# Patient Record
Sex: FEMALE | Race: WHITE | NOT HISPANIC OR LATINO | Employment: OTHER | URBAN - NONMETROPOLITAN AREA
[De-identification: names, ages, dates, MRNs, and addresses within clinical notes are randomized per-mention and may not be internally consistent; named-entity substitution may affect disease eponyms.]

---

## 2020-02-28 ENCOUNTER — OFFICE VISIT (OUTPATIENT)
Dept: FAMILY MEDICINE | Facility: OTHER | Age: 60
End: 2020-02-28
Attending: PHYSICIAN ASSISTANT
Payer: COMMERCIAL

## 2020-02-28 VITALS
WEIGHT: 161 LBS | HEART RATE: 95 BPM | TEMPERATURE: 97.3 F | DIASTOLIC BLOOD PRESSURE: 82 MMHG | SYSTOLIC BLOOD PRESSURE: 110 MMHG | OXYGEN SATURATION: 99 % | RESPIRATION RATE: 18 BRPM

## 2020-02-28 DIAGNOSIS — H65.91 OME (OTITIS MEDIA WITH EFFUSION), RIGHT: Primary | ICD-10-CM

## 2020-02-28 DIAGNOSIS — J01.00 ACUTE NON-RECURRENT MAXILLARY SINUSITIS: ICD-10-CM

## 2020-02-28 PROCEDURE — 99203 OFFICE O/P NEW LOW 30 MIN: CPT | Performed by: PHYSICIAN ASSISTANT

## 2020-02-28 NOTE — PATIENT INSTRUCTIONS
Antibiotic has been sent to pharmacy. Please take full course of antibiotic even if symptoms have completely resolved. This helps prevent against antibiotic resistance.     May use symptomatic care with tylenol or ibuprofen. May use cough syrup or cough drops. Using a humidifier works well to break up the congestion. You can also sleep propped up on a couple pillows to decrease symptoms at night.     Use a Neti Pot/sinusflush (Vincent Med Sinus Rinse) 3 times daily to irrigate sinuses/mucosal tissue.     Sudafed or mucinex work well for congestion.   If you choose pseudoephedrine, use for only 5-7 days AS DIRECTED. Speak to your pharmacist if you have any concerns about your medications. Mayalso use decongestant nasal spray, but only for 3 days MAXIMUM.    Please take tylenol as needed up to 4 times daily.  Treat symptomatically with warm salt water gargles.  Lozenges, Tylenol, Advil or Aleve as needed. Frequent swallows of cool liquid.  Oatmeal coats the throat and some patients find it soothes the pain.     Monitor for any fevers or chills. Return in 7-10 days if not feeling better. Please call clinic with any questions or concerns. Please take in a lot of fluids and get rest.     You will need to be evaluated if you start to experience:  Fever higher than 102.5 F (39.2 C)   Sudden and severe pain in the face and head   Trouble seeing or seeing double   Trouble thinking clearly   Swelling or redness around 1 or both eyes   Trouble breathing or a stiff neck    * If you are a smoker, try to quit *    Call 9-1-1 or go to the emergency room if you:  Have trouble breathing   Are drooling because you cannot swallow your saliva   Have swelling of the neck or tongue   Cannot move your neck or have trouble opening your mouth

## 2020-02-28 NOTE — PROGRESS NOTES
Nursing Notes:   Frieda Mitchell LPN  2/28/2020 10:32 AM  Signed  Chief Complaint   Patient presents with     Sinus Problem     Ear Problem     Cough         Medication Reconciliation: complete    Frieda Mitchell LPN      HPI:    Анна Styles is a 60 year old female who presents for sinus pain on cheeks.  Green nasal discharge.  Ear pain.  Hx perforated ear drum in the past.  No ear drainage.  Fevers and chills.  Cough.  No wheezing, rattling in the lungs. Sore throat on Monday 2/24.  Symptoms are not worse the next day.  Runny nose.  Teeth pain.  Keeping food and fluids down.  No dehydration concerns.  No GI symptoms.    History reviewed. No pertinent past medical history.    History reviewed. No pertinent surgical history.    History reviewed. No pertinent family history.    Social History     Tobacco Use     Smoking status: Never Smoker     Smokeless tobacco: Never Used   Substance Use Topics     Alcohol use: Not Currently       Current Outpatient Medications   Medication Sig Dispense Refill     amoxicillin-clavulanate (AUGMENTIN) 875-125 MG tablet Take 1 tablet by mouth 2 times daily for 10 days 20 tablet 0     Cholecalciferol 25 MCG/0.03ML LIQD Take 1 capsule by mouth         Allergies   Allergen Reactions     Eucalyptus Oil      Other reaction(s): Throat Swelling/Closing     Morphine Rash     Codeine Nausea and Vomiting       REVIEW OF SYSTEMS:  Refer to HPI.    EXAM:   Vitals:    /82 (BP Location: Right arm, Patient Position: Sitting, Cuff Size: Adult Regular)   Pulse 95   Temp 97.3  F (36.3  C)   Resp 18   Wt 73 kg (161 lb)   SpO2 99%     General Appearance: Pleasant, alert, appropriate appearance for age. No acute distress  Ear Exam: Erythematous right TM, translucent, bony landmarks appreciated.   Left TM: Effusion is not present. TM is not bulging. There is no pus appreciated.    Right TM: Effusion is present. TM is bulging. There is no pus appreciated.   Normal auditory  canals and external ears. Non-tender.  NOroPharynx Exam:  Erythematous posterior pharynx with no exudates. Sinus pain upon palpation of maxillary sinuses.  Chest/Respiratory Exam: Normal chest wall and respirations. Clear to auscultation.  No wheezing or crackling appreciated.  No retractions appreciated.  Cardiovascular Exam: Regular rate and rhythm. S1, S2, no murmur, click, gallop, or rubs.  Lymphatic Exam: ACLN.  Skin: no rash or abnormalities  Psychiatric Exam: Alert and oriented - appropriate affect.    PHQ Depression Screen  No flowsheet data found.    LABS:    No results found for any visits on 02/28/20.      ASSESSMENT AND PLAN:      ICD-10-CM    1. OME (otitis media with effusion), right H65.91 amoxicillin-clavulanate (AUGMENTIN) 875-125 MG tablet   2. Acute non-recurrent maxillary sinusitis J01.00 amoxicillin-clavulanate (AUGMENTIN) 875-125 MG tablet         Patient was started on Augmentin for right otitis media along with an acute nonrecurrent maxillary sinus infection.  Gave patient education.  Return to clinic with change/worsening of symptoms.     Patient Instructions   Antibiotic has been sent to pharmacy. Please take full course of antibiotic even if symptoms have completely resolved. This helps prevent against antibiotic resistance.     May use symptomatic care with tylenol or ibuprofen. May use cough syrup or cough drops. Using a humidifier works well to break up the congestion. You can also sleep propped up on a couple pillows to decrease symptoms at night.     Use a Neti Pot/sinusflush (Vincent Med Sinus Rinse) 3 times daily to irrigate sinuses/mucosal tissue.     Sudafed or mucinex work well for congestion.   If you choose pseudoephedrine, use for only 5-7 days AS DIRECTED. Speak to your pharmacist if you have any concerns about your medications. Mayalso use decongestant nasal spray, but only for 3 days MAXIMUM.    Please take tylenol as needed up to 4 times daily.  Treat symptomatically with  warm salt water gargles.  Lozenges, Tylenol, Advil or Aleve as needed. Frequent swallows of cool liquid.  Oatmeal coats the throat and some patients find it soothes the pain.     Monitor for any fevers or chills. Return in 7-10 days if not feeling better. Please call clinic with any questions or concerns. Please take in a lot of fluids and get rest.     You will need to be evaluated if you start to experience:  Fever higher than 102.5 F (39.2 C)   Sudden and severe pain in the face and head   Trouble seeing or seeing double   Trouble thinking clearly   Swelling or redness around 1 or both eyes   Trouble breathing or a stiff neck    * If you are a smoker, try to quit *    Call 9-1-1 or go to the emergency room if you:  Have trouble breathing   Are drooling because you cannot swallow your saliva   Have swelling of the neck or tongue   Cannot move your neck or have trouble opening your mouth         Alis Taylor PA-C PA-C..................2/28/2020 10:32 AM

## 2020-02-28 NOTE — NURSING NOTE
Chief Complaint   Patient presents with     Sinus Problem     Ear Problem     Cough         Medication Reconciliation: complete    Frieda Mitchell, LPN

## 2020-03-04 PROBLEM — K35.30 ACUTE APPENDICITIS WITH LOCALIZED PERITONITIS: Status: ACTIVE | Noted: 2020-03-04

## 2020-03-16 ENCOUNTER — TELEPHONE (OUTPATIENT)
Dept: FAMILY MEDICINE | Facility: OTHER | Age: 60
End: 2020-03-16

## 2020-03-16 DIAGNOSIS — H65.91 OME (OTITIS MEDIA WITH EFFUSION), RIGHT: Primary | ICD-10-CM

## 2020-03-16 DIAGNOSIS — J01.00 ACUTE NON-RECURRENT MAXILLARY SINUSITIS: ICD-10-CM

## 2020-03-16 RX ORDER — CEFDINIR 300 MG/1
300 CAPSULE ORAL 2 TIMES DAILY
Qty: 20 CAPSULE | Refills: 0 | Status: SHIPPED | OUTPATIENT
Start: 2020-03-16 | End: 2020-03-26

## 2020-03-16 NOTE — TELEPHONE ENCOUNTER
Patient notified that prescription was sent to pharmacy.  Astrid Almazan LPN  3/16/2020  9:37 AM

## 2020-03-16 NOTE — TELEPHONE ENCOUNTER
O-Patient called and state she just finished her antibiotics for a sinus infection and it hasn't gone away. Wondering if a antibiotic can just be called in so she doesn't have to come in. [facility] is [facility]. Please call patient to advise.  Aurelio Cruz on 3/16/2020 at 8:20 AM

## 2020-06-09 ENCOUNTER — TELEPHONE (OUTPATIENT)
Dept: FAMILY MEDICINE | Facility: OTHER | Age: 60
End: 2020-06-09

## 2020-06-09 NOTE — TELEPHONE ENCOUNTER
Patient has never seen Tofte?  Patient say LEV on 2/28/2020, will route to her for consideration.    Sophie Thurman LPN on 6/9/2020 at 4:25 PM

## 2020-06-09 NOTE — TELEPHONE ENCOUNTER
"Santy is telling patient she just needs a note that her weight at 145 at 5'7\" is a healthy weight to be at so she can stop the program-please call when you can-aware not here today  "

## 2020-06-09 NOTE — LETTER
2020      Анна Styles  [id number] [facility] [address]        To Whom It May Concern,     Анна Styles,  1960, is in a weight loss program.  The patient is currently on 145 pounds and is 5 feet 7 inches with a BMI of 23.  This is a healthy weight for her age.  She is at a healthy weight if she would prefer to stop the program.      Sincerely,        Alis Taylor PA-C ..................2020 7:41 AM

## 2020-06-10 NOTE — TELEPHONE ENCOUNTER
Please call the patient and clarify the request.  What program is she in?  The last weight we have documented is 161 pounds.  Alis Taylor PA-C.......... 6/10/2020 8:00 AM

## 2020-06-10 NOTE — TELEPHONE ENCOUNTER
She has been using [facility] for her weight management program.  All she would like to know is, if 145# and 5'7 with a BMI of 23 is a healthy weight for her age also.  She needs a letter stating your opinion for [facility] program.  Audra Millard LPN..........6/10/2020  4:07 PM

## 2020-06-11 NOTE — TELEPHONE ENCOUNTER
Patient would like this letter mailed.  Letter mailed today.  Norma J. Gosselin, LPN .......  6/11/2020  10:40 AM

## 2021-02-07 ENCOUNTER — HEALTH MAINTENANCE LETTER (OUTPATIENT)
Age: 61
End: 2021-02-07

## 2021-03-09 ENCOUNTER — HOSPITAL ENCOUNTER (OUTPATIENT)
Dept: MAMMOGRAPHY | Facility: OTHER | Age: 61
Discharge: HOME | End: 2021-03-09
Attending: FAMILY MEDICINE | Admitting: FAMILY MEDICINE
Payer: COMMERCIAL

## 2021-03-09 DIAGNOSIS — Z12.31 VISIT FOR SCREENING MAMMOGRAM: ICD-10-CM

## 2021-03-09 PROCEDURE — 77067 SCR MAMMO BI INCL CAD: CPT

## 2021-09-07 ENCOUNTER — ALLIED HEALTH/NURSE VISIT (OUTPATIENT)
Dept: FAMILY MEDICINE | Facility: OTHER | Age: 61
End: 2021-09-07
Payer: COMMERCIAL

## 2021-09-07 DIAGNOSIS — Z23 NEED FOR ZOSTER VACCINATION: Primary | ICD-10-CM

## 2021-09-07 PROCEDURE — 90471 IMMUNIZATION ADMIN: CPT

## 2021-09-07 PROCEDURE — 90750 HZV VACC RECOMBINANT IM: CPT

## 2021-09-07 NOTE — PROGRESS NOTES
Immunization Documentation - Shingrix #1  Verified patient's first and last name, and . Stated reason for visit today is to receive the Shingrix vaccine. Denied any concerns with previous immunizations. Allergies reviewed. VIS handout(s) reviewed and given to take home. Shingrix prepared and administered IM per standing order. Administration documented in IMMUNIZATIONS (see flowsheet and order for further information). Patient Instructed to wait in lobby for 15 minutes post-injection and notify staff immediately of any reaction.     Ladonna Perez RN ....................  2021   11:12 AM

## 2021-09-24 ENCOUNTER — ALLIED HEALTH/NURSE VISIT (OUTPATIENT)
Dept: FAMILY MEDICINE | Facility: OTHER | Age: 61
End: 2021-09-24
Attending: FAMILY MEDICINE
Payer: COMMERCIAL

## 2021-09-24 DIAGNOSIS — R05.9 COUGH: Primary | ICD-10-CM

## 2021-09-24 PROCEDURE — U0005 INFEC AGEN DETEC AMPLI PROBE: HCPCS | Mod: ZL

## 2021-09-24 PROCEDURE — C9803 HOPD COVID-19 SPEC COLLECT: HCPCS

## 2021-09-26 LAB — SARS-COV-2 RNA RESP QL NAA+PROBE: NEGATIVE

## 2021-11-08 ENCOUNTER — ALLIED HEALTH/NURSE VISIT (OUTPATIENT)
Dept: FAMILY MEDICINE | Facility: OTHER | Age: 61
End: 2021-11-08
Attending: PHYSICIAN ASSISTANT
Payer: COMMERCIAL

## 2021-11-08 DIAGNOSIS — Z23 NEED FOR ZOSTER VACCINATION: ICD-10-CM

## 2021-11-08 DIAGNOSIS — Z23 NEED FOR VACCINATION: Primary | ICD-10-CM

## 2021-11-08 DIAGNOSIS — Z23 NEED FOR PROPHYLACTIC VACCINATION AND INOCULATION AGAINST INFLUENZA: ICD-10-CM

## 2021-11-08 PROCEDURE — 90682 RIV4 VACC RECOMBINANT DNA IM: CPT

## 2021-11-08 PROCEDURE — 90632 HEPA VACCINE ADULT IM: CPT

## 2021-11-08 PROCEDURE — 90750 HZV VACC RECOMBINANT IM: CPT

## 2021-11-08 PROCEDURE — 90715 TDAP VACCINE 7 YRS/> IM: CPT

## 2021-11-08 PROCEDURE — 90471 IMMUNIZATION ADMIN: CPT

## 2021-11-08 PROCEDURE — 90472 IMMUNIZATION ADMIN EACH ADD: CPT

## 2021-11-08 NOTE — PROGRESS NOTES
Immunization Documentation  Verified patient's first and last name, and . Stated reason for visit today is to receive Shingrix #2, Boostrix, Flu and Hep A vaccines. Accompained to clinic visit with self. Denied any concerns with previous immunizations. Allergies reviewed. VIS handout(s) reviewed and given to take home. vaccines prepared and administered per standing order. Administration documented in IMMUNIZATIONS (see flowsheet and order for further information). Pt Instructed to wait in lobby for 15 minutes post-injection and notify staff immediately of any reaction.     Ladonna Perez RN ....................  2021   1:30 PM

## 2022-09-15 ENCOUNTER — IMMUNIZATION (OUTPATIENT)
Dept: FAMILY MEDICINE | Facility: OTHER | Age: 62
End: 2022-09-15
Attending: FAMILY MEDICINE
Payer: COMMERCIAL

## 2022-09-15 DIAGNOSIS — Z23 HIGH PRIORITY FOR 2019-NCOV VACCINE: ICD-10-CM

## 2022-09-15 DIAGNOSIS — Z23 NEED FOR PROPHYLACTIC VACCINATION AND INOCULATION AGAINST INFLUENZA: Primary | ICD-10-CM

## 2022-09-15 PROCEDURE — 0124A COVID-19,PF,PFIZER BOOSTER BIVALENT: CPT

## 2022-09-15 PROCEDURE — 90471 IMMUNIZATION ADMIN: CPT

## 2022-09-15 PROCEDURE — 91312 COVID-19,PF,PFIZER BOOSTER BIVALENT: CPT

## 2022-09-15 PROCEDURE — 90682 RIV4 VACC RECOMBINANT DNA IM: CPT

## 2023-03-13 ENCOUNTER — OFFICE VISIT (OUTPATIENT)
Dept: FAMILY MEDICINE | Facility: OTHER | Age: 63
End: 2023-03-13
Attending: PHYSICIAN ASSISTANT
Payer: COMMERCIAL

## 2023-03-13 VITALS
DIASTOLIC BLOOD PRESSURE: 80 MMHG | HEART RATE: 81 BPM | WEIGHT: 148.5 LBS | OXYGEN SATURATION: 98 % | HEIGHT: 66 IN | SYSTOLIC BLOOD PRESSURE: 128 MMHG | RESPIRATION RATE: 20 BRPM | BODY MASS INDEX: 23.87 KG/M2 | TEMPERATURE: 97.4 F

## 2023-03-13 DIAGNOSIS — Z00.00 ROUTINE GENERAL MEDICAL EXAMINATION AT A HEALTH CARE FACILITY: Primary | ICD-10-CM

## 2023-03-13 DIAGNOSIS — B35.1 TOENAIL FUNGUS: ICD-10-CM

## 2023-03-13 DIAGNOSIS — Z23 NEED FOR VACCINATION FOR PNEUMOCOCCUS: ICD-10-CM

## 2023-03-13 DIAGNOSIS — Z13.1 SCREENING FOR DIABETES MELLITUS: ICD-10-CM

## 2023-03-13 DIAGNOSIS — Z11.59 ENCOUNTER FOR HEPATITIS C SCREENING TEST FOR LOW RISK PATIENT: ICD-10-CM

## 2023-03-13 DIAGNOSIS — Z13.220 SCREENING CHOLESTEROL LEVEL: ICD-10-CM

## 2023-03-13 DIAGNOSIS — Z12.11 SPECIAL SCREENING FOR MALIGNANT NEOPLASMS, COLON: ICD-10-CM

## 2023-03-13 DIAGNOSIS — Z11.4 SCREENING FOR HIV (HUMAN IMMUNODEFICIENCY VIRUS): ICD-10-CM

## 2023-03-13 DIAGNOSIS — Z12.31 ENCOUNTER FOR SCREENING MAMMOGRAM FOR MALIGNANT NEOPLASM OF BREAST: ICD-10-CM

## 2023-03-13 PROBLEM — K35.30 ACUTE APPENDICITIS WITH LOCALIZED PERITONITIS: Status: RESOLVED | Noted: 2020-03-04 | Resolved: 2023-03-13

## 2023-03-13 LAB
ALBUMIN SERPL BCG-MCNC: 4.6 G/DL (ref 3.5–5.2)
ALP SERPL-CCNC: 56 U/L (ref 35–104)
ALT SERPL W P-5'-P-CCNC: 21 U/L (ref 10–35)
ANION GAP SERPL CALCULATED.3IONS-SCNC: 8 MMOL/L (ref 7–15)
AST SERPL W P-5'-P-CCNC: 20 U/L (ref 10–35)
BASOPHILS # BLD AUTO: 0 10E3/UL (ref 0–0.2)
BASOPHILS NFR BLD AUTO: 1 %
BILIRUB SERPL-MCNC: 0.3 MG/DL
BUN SERPL-MCNC: 11.7 MG/DL (ref 8–23)
CALCIUM SERPL-MCNC: 9.4 MG/DL (ref 8.8–10.2)
CHLORIDE SERPL-SCNC: 104 MMOL/L (ref 98–107)
CHOLEST SERPL-MCNC: 255 MG/DL
CREAT SERPL-MCNC: 0.51 MG/DL (ref 0.51–0.95)
DEPRECATED HCO3 PLAS-SCNC: 29 MMOL/L (ref 22–29)
EOSINOPHIL # BLD AUTO: 0.1 10E3/UL (ref 0–0.7)
EOSINOPHIL NFR BLD AUTO: 2 %
ERYTHROCYTE [DISTWIDTH] IN BLOOD BY AUTOMATED COUNT: 12.1 % (ref 10–15)
GFR SERPL CREATININE-BSD FRML MDRD: >90 ML/MIN/1.73M2
GLUCOSE SERPL-MCNC: 93 MG/DL (ref 70–99)
HCT VFR BLD AUTO: 39.6 % (ref 35–47)
HDLC SERPL-MCNC: 78 MG/DL
HGB BLD-MCNC: 13.3 G/DL (ref 11.7–15.7)
IMM GRANULOCYTES # BLD: 0 10E3/UL
IMM GRANULOCYTES NFR BLD: 0 %
LDLC SERPL CALC-MCNC: 143 MG/DL
LYMPHOCYTES # BLD AUTO: 1.2 10E3/UL (ref 0.8–5.3)
LYMPHOCYTES NFR BLD AUTO: 26 %
MCH RBC QN AUTO: 32.4 PG (ref 26.5–33)
MCHC RBC AUTO-ENTMCNC: 33.6 G/DL (ref 31.5–36.5)
MCV RBC AUTO: 96 FL (ref 78–100)
MONOCYTES # BLD AUTO: 0.4 10E3/UL (ref 0–1.3)
MONOCYTES NFR BLD AUTO: 9 %
NEUTROPHILS # BLD AUTO: 2.8 10E3/UL (ref 1.6–8.3)
NEUTROPHILS NFR BLD AUTO: 62 %
NONHDLC SERPL-MCNC: 177 MG/DL
NRBC # BLD AUTO: 0 10E3/UL
NRBC BLD AUTO-RTO: 0 /100
PLATELET # BLD AUTO: 246 10E3/UL (ref 150–450)
POTASSIUM SERPL-SCNC: 3.6 MMOL/L (ref 3.4–5.3)
PROT SERPL-MCNC: 7.1 G/DL (ref 6.4–8.3)
RBC # BLD AUTO: 4.11 10E6/UL (ref 3.8–5.2)
SODIUM SERPL-SCNC: 141 MMOL/L (ref 136–145)
TRIGL SERPL-MCNC: 172 MG/DL
WBC # BLD AUTO: 4.4 10E3/UL (ref 4–11)

## 2023-03-13 PROCEDURE — 90471 IMMUNIZATION ADMIN: CPT | Performed by: PHYSICIAN ASSISTANT

## 2023-03-13 PROCEDURE — 85004 AUTOMATED DIFF WBC COUNT: CPT | Mod: ZL | Performed by: PHYSICIAN ASSISTANT

## 2023-03-13 PROCEDURE — 86803 HEPATITIS C AB TEST: CPT | Mod: ZL | Performed by: PHYSICIAN ASSISTANT

## 2023-03-13 PROCEDURE — 80061 LIPID PANEL: CPT | Mod: ZL | Performed by: PHYSICIAN ASSISTANT

## 2023-03-13 PROCEDURE — 90732 PPSV23 VACC 2 YRS+ SUBQ/IM: CPT | Performed by: PHYSICIAN ASSISTANT

## 2023-03-13 PROCEDURE — 36415 COLL VENOUS BLD VENIPUNCTURE: CPT | Mod: ZL | Performed by: PHYSICIAN ASSISTANT

## 2023-03-13 PROCEDURE — 87389 HIV-1 AG W/HIV-1&-2 AB AG IA: CPT | Mod: ZL | Performed by: PHYSICIAN ASSISTANT

## 2023-03-13 PROCEDURE — 99386 PREV VISIT NEW AGE 40-64: CPT | Mod: 25 | Performed by: PHYSICIAN ASSISTANT

## 2023-03-13 PROCEDURE — 80053 COMPREHEN METABOLIC PANEL: CPT | Mod: ZL | Performed by: PHYSICIAN ASSISTANT

## 2023-03-13 RX ORDER — TERBINAFINE HYDROCHLORIDE 250 MG/1
250 TABLET ORAL DAILY
Qty: 90 TABLET | Refills: 0 | Status: SHIPPED | OUTPATIENT
Start: 2023-03-13 | End: 2023-06-11

## 2023-03-13 ASSESSMENT — ENCOUNTER SYMPTOMS
WEAKNESS: 0
FREQUENCY: 0
PALPITATIONS: 0
NAUSEA: 0
HEARTBURN: 0
SORE THROAT: 0
EYE PAIN: 0
ARTHRALGIAS: 1
DYSURIA: 0
HEADACHES: 1
HEMATOCHEZIA: 0
FEVER: 0
HEMATURIA: 0
MYALGIAS: 0
COUGH: 0
CHILLS: 0
CONSTIPATION: 0
SHORTNESS OF BREATH: 0
PARESTHESIAS: 0
DIZZINESS: 0
NERVOUS/ANXIOUS: 0
DIARRHEA: 0
ABDOMINAL PAIN: 0
JOINT SWELLING: 0
BREAST MASS: 0

## 2023-03-13 ASSESSMENT — PAIN SCALES - GENERAL: PAINLEVEL: NO PAIN (0)

## 2023-03-13 NOTE — PROGRESS NOTES
SUBJECTIVE:   CC: Анна is an 63 year old who presents for preventive health visit.   Patient has been advised of split billing requirements and indicates understanding: Yes  Healthy Habits:     Getting at least 3 servings of Calcium per day:  Yes    Bi-annual eye exam:  Yes    Dental care twice a year:  Yes    Sleep apnea or symptoms of sleep apnea:  None    Diet:  Regular (no restrictions)    Frequency of exercise:  6-7 days/week    Duration of exercise:  15-30 minutes    Taking medications regularly:  Not Applicable    Medication side effects:  Not applicable    PHQ-2 Total Score: 0    Additional concerns today:  No    No LMP recorded. Patient has had a hysterectomy.   Contraception: hysterectomy, non-cancerous reasons  Risk for STI?: none  Last pap: na  Any hx of abnormal paps:  none  FH of early CA?: none  Cholesterol/DM concerns/screenin, repeated  Tobacco?: no  Lung cancer screening: na  Calcium intake: yes  DEXA: none, declines  Last mammo: , ordered repeat  Colonoscopy: , ordered cologuard  Hepatitis C screen: none, completed   HIV screen: none, completed   Immunizations: needs to complete pneumonia series. Gave pneumovax 23    Patient has been struggling with a right foot toe fungus.  Has been present since 2022.  Has tried over-the-counter treatments which have not helped.  Wondering about getting a prescription.  Some of the toenails have  from the nailbed.  Discoloration of the toenails.    Today's PHQ-2 Score:   PHQ-2 (  [employer]) 3/13/2023   Q1: Little interest or pleasure in doing things 0   Q2: Feeling down, depressed or hopeless 0   PHQ-2 Score 0   PHQ-2 Total Score (12-17 Years)- Positive if 3 or more points; Administer PHQ-A if positive -   Q1: Little interest or pleasure in doing things Not at all   Q2: Feeling down, depressed or hopeless Not at all   PHQ-2 Score 0       Have you ever done Advance Care Planning? (For example, a Health Directive, POLST, or a  discussion with a medical provider or your loved ones about your wishes): Yes, patient states has an Advance Care Planning document and will bring a copy to the clinic.    Social History     Tobacco Use     Smoking status: Never     Smokeless tobacco: Never   Substance Use Topics     Alcohol use: Not Currently         Alcohol Use 3/13/2023   Prescreen: >3 drinks/day or >7 drinks/week? No       Reviewed orders with patient.  Reviewed health maintenance and updated orders accordingly - Yes  Labs reviewed in EPIC  BP Readings from Last 3 Encounters:   03/13/23 128/80   02/28/20 110/82    Wt Readings from Last 3 Encounters:   03/13/23 67.4 kg (148 lb 8 oz)   02/28/20 73 kg (161 lb)                  Patient Active Problem List   Diagnosis     Achilles tendonitis     Baker's cyst of knee     BPV (benign positional vertigo)     Leukopenia     Syncope and collapse     Toenail fungus     Past Surgical History:   Procedure Laterality Date     CATARACT EXTRACTION Bilateral      GALLBLADDER SURGERY       HYSTERECTOMY      noncancerous, had bad periods     HYSTERECTOMY, PAP NO LONGER INDICATED         Social History     Tobacco Use     Smoking status: Never     Smokeless tobacco: Never   Substance Use Topics     Alcohol use: Not Currently     Family History   Problem Relation Age of Onset     Heart Disease Father          Current Outpatient Medications   Medication Sig Dispense Refill     terbinafine (LAMISIL) 250 MG tablet Take 1 tablet (250 mg) by mouth daily for 90 days 90 tablet 0     Cholecalciferol 25 MCG/0.03ML LIQD Take 1 capsule by mouth       Allergies   Allergen Reactions     Eucalyptus Oil      Other reaction(s): Throat Swelling/Closing     Morphine Rash     Codeine Nausea and Vomiting     No lab results found.     Breast Cancer Screening:  Any new diagnosis of family breast, ovarian, or bowel cancer? No    FHS-7: No flowsheet data found.    Mammogram Screening: Recommended mammography every 1-2 years with patient  discussion and risk factor consideration  Pertinent mammograms are reviewed under the imaging tab.    History of abnormal Pap smear: Status post benign hysterectomy. Health Maintenance and Surgical History updated.     Reviewed and updated as needed this visit by clinical staff   Tobacco  Allergies  Meds  Problems  Med Hx  Surg Hx  Fam Hx          Reviewed and updated as needed this visit by Provider   Tobacco  Allergies  Meds  Problems  Med Hx  Surg Hx  Fam Hx         Past Medical History:   Diagnosis Date     Acute appendicitis with localized peritonitis 3/4/2020      Past Surgical History:   Procedure Laterality Date     CATARACT EXTRACTION Bilateral      GALLBLADDER SURGERY       HYSTERECTOMY      noncancerous, had bad periods     HYSTERECTOMY, PAP NO LONGER INDICATED       OB History   No obstetric history on file.       Review of Systems   Constitutional: Negative for chills and fever.   HENT: Negative for congestion, ear pain, hearing loss and sore throat.    Eyes: Negative for pain and visual disturbance.   Respiratory: Negative for cough and shortness of breath.    Cardiovascular: Negative for chest pain, palpitations and peripheral edema.   Gastrointestinal: Negative for abdominal pain, constipation, diarrhea, heartburn, hematochezia and nausea.   Breasts:  Negative for tenderness, breast mass and discharge.   Genitourinary: Negative for dysuria, frequency, genital sores, hematuria, pelvic pain, urgency, vaginal bleeding and vaginal discharge.   Musculoskeletal: Positive for arthralgias. Negative for joint swelling and myalgias.   Skin: Negative for rash.   Neurological: Positive for headaches. Negative for dizziness, weakness and paresthesias.   Psychiatric/Behavioral: Negative for mood changes. The patient is not nervous/anxious.           OBJECTIVE:   /80 (BP Location: Right arm, Patient Position: Sitting, Cuff Size: Adult Regular)   Pulse 81   Temp 97.4  F (36.3  C) (Tympanic)    "Resp 20   Ht 1.676 m (5' 6\")   Wt 67.4 kg (148 lb 8 oz)   SpO2 98%   BMI 23.97 kg/m    Physical Exam  GENERAL APPEARANCE: healthy, alert and no distress  EYES: Eyes grossly normal to inspection, PERRL and conjunctivae and sclerae normal  HENT: ear canals and TM's normal, nose and mouth without ulcers or lesions, oropharynx clear and oral mucous membranes moist  NECK: no adenopathy, no asymmetry, masses, or scars and thyroid normal to palpation  RESP: lungs clear to auscultation - no rales, rhonchi or wheezes  CV: regular rate and rhythm, normal S1 S2, no S3 or S4, no murmur, click or rub, no peripheral edema and peripheral pulses strong  ABDOMEN: soft, nontender, no hepatosplenomegaly, no masses and bowel sounds normal  MS: no musculoskeletal defects are noted and gait is age appropriate without ataxia  SKIN: no suspicious lesions or rashes.  Right toenails are thickened and have dark yellow/green discoloration.  NEURO: Normal strength and tone, sensory exam grossly normal, mentation intact and speech normal  PSYCH: mentation appears normal and affect normal/bright    Diagnostic Test Results:  Labs reviewed in Epic    ASSESSMENT/PLAN:       ICD-10-CM    1. Routine general medical examination at a health care facility  Z00.00       2. Screening cholesterol level  Z13.220 Lipid Panel     CBC and Differential     Comprehensive Metabolic Panel     Lipid Panel     CBC and Differential     Comprehensive Metabolic Panel      3. Screening for diabetes mellitus  Z13.1 CBC and Differential     Comprehensive Metabolic Panel     CBC and Differential     Comprehensive Metabolic Panel      4. Encounter for screening mammogram for malignant neoplasm of breast  Z12.31 MA Screen Bilateral w/[place]      5. Encounter for hepatitis C screening test for low risk patient  Z11.59 Hepatitis C Screen Reflex to HCV RNA Quant and Genotype     Hepatitis C Screen Reflex to HCV RNA Quant and Genotype      6. Screening for HIV (human " immunodeficiency virus)  Z11.4 HIV Antigen Antibody Combo     HIV Antigen Antibody Combo      7. Special screening for malignant neoplasms, colon  Z12.11 COLOGUARD(EXACT SCIENCES)      8. Toenail fungus  B35.1 terbinafine (LAMISIL) 250 MG tablet      9. Need for vaccination for pneumococcus  Z23 GH IMM-  PNEUMOCOCCAL VACCINE,ADULT,SQ OR IM        Completed cholesterol and diabetes mellitus screening.    Ordered mammogram for breast cancer screening.    Completed low risk HIV and hepatitis C screen.    Ordered Cologuard for colon cancer screening.    Toenail fungus: Patient was given Lamisil for treatment.  Completed CMP for monitoring.  We will recheck liver function in 6 weeks for monitoring.  Gave warning signs and symptoms.  Return as needed for recheck.    Patient was given Pneumovax 23 to complete the pneumonia series.    Offered DEXA scan however patient declined at this time.    Repeat physical in 1 year.    Patient has been advised of split billing requirements and indicates understanding: Yes      COUNSELING:  Reviewed preventive health counseling, as reflected in patient instructions       Regular exercise       Healthy diet/nutrition       Vision screening       Hearing screening       Osteoporosis prevention/bone health       Colorectal Cancer Screening       Consider Hep C screening for all patients one time for ages 18-79 years       HIV screeninx in teen years, 1x in adult years, and at intervals if high risk        She reports that she has never smoked. She has never used smokeless tobacco.      Alis Taylor PA-C  [facility]

## 2023-03-13 NOTE — NURSING NOTE
Pt presents to clinic today for A PHYSICAL. States she has toe fungus on right foot, all toes. Has tried multiple things over the counter, has soaked her foot in apple cider vinegar with no relief.       FOOD SECURITY SCREENING QUESTIONS:    The next two questions are to help us understand your food security.  If you are feeling you need any assistance in this area, we have resources available to support you today.    Hunger Vital Signs:  Within the past 12 months we worried whether our food would run out before we got money to buy more. Never  Within the past 12 months the food we bought just didn't last and we didn't have money to get more. Never        Advance care directive on file? no  Advance care directive provided to patient? yes     Medication Reconciliation: complete  Misty Islas LPN,LPN on 3/13/2023 at 11:30 AM

## 2023-03-14 LAB
HCV AB SERPL QL IA: NONREACTIVE
HIV 1+2 AB+HIV1 P24 AG SERPL QL IA: NONREACTIVE

## 2023-03-26 LAB — NONINV COLON CA DNA+OCC BLD SCRN STL QL: NORMAL

## 2023-03-28 ENCOUNTER — TELEPHONE (OUTPATIENT)
Dept: FAMILY MEDICINE | Facility: OTHER | Age: 63
End: 2023-03-28
Payer: COMMERCIAL

## 2023-03-28 DIAGNOSIS — Z12.11 SPECIAL SCREENING FOR MALIGNANT NEOPLASMS, COLON: Primary | ICD-10-CM

## 2023-04-18 ENCOUNTER — HOSPITAL ENCOUNTER (OUTPATIENT)
Dept: MAMMOGRAPHY | Facility: OTHER | Age: 63
Discharge: HOME | End: 2023-04-18
Attending: PHYSICIAN ASSISTANT | Admitting: PHYSICIAN ASSISTANT
Payer: COMMERCIAL

## 2023-04-18 DIAGNOSIS — Z12.31 ENCOUNTER FOR SCREENING MAMMOGRAM FOR MALIGNANT NEOPLASM OF BREAST: ICD-10-CM

## 2023-04-18 PROCEDURE — 77067 SCR MAMMO BI INCL CAD: CPT

## 2023-11-20 ENCOUNTER — NURSE TRIAGE (OUTPATIENT)
Dept: FAMILY MEDICINE | Facility: OTHER | Age: 63
End: 2023-11-20
Payer: COMMERCIAL

## 2023-11-20 ENCOUNTER — OFFICE VISIT (OUTPATIENT)
Dept: FAMILY MEDICINE | Facility: OTHER | Age: 63
End: 2023-11-20
Attending: STUDENT IN AN ORGANIZED HEALTH CARE EDUCATION/TRAINING PROGRAM
Payer: COMMERCIAL

## 2023-11-20 VITALS
OXYGEN SATURATION: 99 % | RESPIRATION RATE: 18 BRPM | HEIGHT: 66 IN | DIASTOLIC BLOOD PRESSURE: 74 MMHG | SYSTOLIC BLOOD PRESSURE: 130 MMHG | WEIGHT: 149 LBS | HEART RATE: 80 BPM | BODY MASS INDEX: 23.95 KG/M2 | TEMPERATURE: 97.1 F

## 2023-11-20 DIAGNOSIS — H81.10 BENIGN PAROXYSMAL POSITIONAL VERTIGO, UNSPECIFIED LATERALITY: Primary | ICD-10-CM

## 2023-11-20 PROCEDURE — 99213 OFFICE O/P EST LOW 20 MIN: CPT | Performed by: STUDENT IN AN ORGANIZED HEALTH CARE EDUCATION/TRAINING PROGRAM

## 2023-11-20 RX ORDER — SWAB
1 SWAB, NON-MEDICATED MISCELLANEOUS DAILY PRN
COMMUNITY
Start: 2023-11-20

## 2023-11-20 RX ORDER — ONDANSETRON 4 MG/1
4 TABLET, ORALLY DISINTEGRATING ORAL EVERY 8 HOURS PRN
Qty: 30 TABLET | Refills: 0 | Status: SHIPPED | OUTPATIENT
Start: 2023-11-20

## 2023-11-20 RX ORDER — MECLIZINE HYDROCHLORIDE 25 MG/1
25 TABLET ORAL 3 TIMES DAILY PRN
Qty: 30 TABLET | Refills: 3 | Status: SHIPPED | OUTPATIENT
Start: 2023-11-20

## 2023-11-20 ASSESSMENT — PAIN SCALES - GENERAL: PAINLEVEL: NO PAIN (0)

## 2023-11-20 NOTE — TELEPHONE ENCOUNTER
"Reason for Disposition   Patient wants to be seen    Additional Information   Negative: ACUTE NEUROLOGIC SYMPTOM and symptom present now   Negative: Knocked out (unconscious) > 1 minute   Negative: Seizure (convulsion) occurred  (Exception: Prior history of seizures and now alert and without Acute Neurologic Symptoms.)   Negative: Neck pain after dangerous injury (e.g., MVA, diving, trampoline, contact sports, fall > 10 feet or 3 meters)  (Exception: Neck pain began > 1 hour after injury.)   Negative: Major bleeding (actively dripping or spurting) that can't be stopped   Negative: Penetrating head injury (e.g., knife, gunshot wound, metal object)   Negative: Sounds like a life-threatening emergency to the triager   Negative: Diagnosed with a concussion within last 14 days   Negative: Can't remember what happened (amnesia)   Negative: Vomiting once or more   Negative: Watery or blood-tinged fluid dripping from the nose or ears   Negative: ACUTE NEUROLOGIC SYMPTOM and now fine   Negative: Knocked out (unconscious) < 1 minute and now fine   Negative: SEVERE headache   Negative: Dangerous injury (e.g., MVA, diving, trampoline, contact sports, fall > 10 feet or 3 meters) or severe blow from hard object (e.g., golf club or baseball bat)   Negative: Large swelling or bruise (> 2 inches or 5 cm)   Negative: Skin is split open or gaping (length > 1/2 inch or 12 mm)   Negative: Bleeding won't stop after 10 minutes of direct pressure (using correct technique)   Negative: One or two 'black eyes' (bruising, purple color of eyelids), and onset within 24 hours of head injury   Negative: Taking Coumadin (warfarin) or other strong blood thinner, or known bleeding disorder (e.g., thrombocytopenia)   Negative: Age over 65 years with an area of head swelling or bruise   Negative: Sounds like a serious injury to the triager    Answer Assessment - Initial Assessment Questions  1. MECHANISM: \"How did the injury happen?\" For falls, ask: " "\"What height did you fall from?\" and \"What surface did you fall against?\"       Fell off 3rd step of ladder hanging artie lights  2. ONSET: \"When did the injury happen?\" (Minutes or hours ago)       \"Few days ago\"  3. NEUROLOGIC SYMPTOMS: \"Was there any loss of consciousness?\" \"Are there any other neurological symptoms?\"       No LOC, no headache  4. MENTAL STATUS: \"Does the person know who they are, who you are, and where they are?\"       yes  5. LOCATION: \"What part of the head was hit?\"         6. SCALP APPEARANCE: \"What does the scalp look like? Is it bleeding now?\" If Yes, ask: \"Is it difficult to stop?\"       no  7. SIZE: For cuts, bruises, or swelling, ask: \"How large is it?\" (e.g., inches or centimeters)       no  8. PAIN: \"Is there any pain?\" If Yes, ask: \"How bad is it?\"  (e.g., Scale 1-10; or mild, moderate, severe)      no  9. TETANUS: For any breaks in the skin, ask: \"When was the last tetanus booster?\"      no  10. BLOOD THINNERS: \"Do you take any blood thinners?\" (e.g., aspirin, clopidogrel / Plavix, coumadin, heparin). Notes: Other strong blood thinners include: Arixtra (fondaparinux), Eliquis (apixaban), Pradaxa (dabigatran), and Xarelto (rivaroxaban).        no  11. OTHER SYMPTOMS: \"Do you have any other symptoms?\" (e.g., neck pain, vomiting)        no  12. PREGNANCY: \"Is there any chance you are pregnant?\" \"When was your last menstrual period?\"        no    Protocols used: Head Injury-A-[place]    "

## 2023-11-20 NOTE — NURSING NOTE
"Medication Reconciliation: Complete    Iqra Davis LPN  11/20/2023 2:08 PM  Chief Complaint   Patient presents with    Fall     Dizziness, nausea, unsteady balance, vertigo       Initial /74 (BP Location: Right arm, Patient Position: Sitting, Cuff Size: Adult Regular)   Pulse 80   Temp 97.1  F (36.2  C) (Tympanic)   Resp 18   Ht 1.676 m (5' 6\")   Wt 67.6 kg (149 lb)   SpO2 99%   BMI 24.05 kg/m   Estimated body mass index is 24.05 kg/m  as calculated from the following:    Height as of this encounter: 1.676 m (5' 6\").    Weight as of this encounter: 67.6 kg (149 lb).  Medication Review: complete    The next two questions are to help us understand your food security.  If you are feeling you need any assistance in this area, we have resources available to support you today.          11/20/2023   SDOH- Food Insecurity   Within the past 12 months, did you worry that your food would run out before you got money to buy more? N   Within the past 12 months, did the food you bought just not last and you didn t have money to get more? N         Health Care Directive:  Patient does not have a Health Care Directive or Living Will: Discussed advance care planning with patient; however, patient declined at this time.    Iqra Davis LPN      "

## 2023-11-20 NOTE — PROGRESS NOTES
Assessment & Plan   Problem List Items Addressed This Visit          Nervous and Auditory    BPV (benign positional vertigo) - Primary    Relevant Medications    ondansetron (ZOFRAN ODT) 4 MG ODT tab    meclizine (ANTIVERT) 25 MG tablet    Other Relevant Orders    Physical Therapy Referral      Patient likely flare of previous BPPV given exam findings and history. Did not hit head.   Given Rx for zofran and meclizine. Referral to PT for vestibular therapy as that has helped in the past                  No follow-ups on file.    Vega Garcia MD  [facility]    Edwige Jj is a 63 year old, presenting for the following health issues:  Fall (Dizziness, nausea, unsteady balance, vertigo)      Fall    History of Present Illness       Reason for visit:  Dizzy nausous  Symptom onset:  3-7 days ago  Symptom intensity:  Moderate  Symptom progression:  Worsening  Had these symptoms before:  Yes  Has tried/received treatment for these symptoms:  Yes  Previous treatment was successful:  Yes  Prior treatment description:  Meclezine  What makes it better:  Not at the moment    She eats 4 or more servings of fruits and vegetables daily.She consumes 0 sweetened beverage(s) daily.She exercises with enough effort to increase her heart rate 20 to 29 minutes per day.  She exercises with enough effort to increase her heart rate 6 days per week.   She is taking medications regularly.     Dizziness   - fell with ladder (leaned with her). Neck, shoulders, and buttocks hurt  - did not hit head or LOC  - on and off nauseated and dizziness a few days later  - walking causes dizziness, turning head causes dizziness, any movement besides keeping head still causes dizziness   - fine if sitting still   - 9 years ago had a seizure then had BPPV after a viral illness              Review of Systems   Constitutional, HEENT, cardiovascular, pulmonary, gi and gu systems are negative, except as otherwise noted.     "  Objective    /74 (BP Location: Right arm, Patient Position: Sitting, Cuff Size: Adult Regular)   Pulse 80   Temp 97.1  F (36.2  C) (Tympanic)   Resp 18   Ht 1.676 m (5' 6\")   Wt 67.6 kg (149 lb)   SpO2 99%   BMI 24.05 kg/m    Body mass index is 24.05 kg/m .  Physical Exam   GENERAL: healthy, alert and no distress  HEAD: atraumatic   EYES: Eyes grossly normal to inspection, PERRL and conjunctivae and sclerae normal  RESP: lungs clear to auscultation - no rales, rhonchi or wheezes  CV: regular rate and rhythm, normal S1 S2, no S3 or S4, no murmur, click or rub, no peripheral edema and peripheral pulses strong  NEURO: Normal strength and tone. Slight nystagmus when laying on right side.   PSYCH: mentation appears normal, affect normal/bright                      "

## 2023-11-22 ENCOUNTER — THERAPY VISIT (OUTPATIENT)
Dept: PHYSICAL THERAPY | Facility: OTHER | Age: 63
End: 2023-11-22
Attending: STUDENT IN AN ORGANIZED HEALTH CARE EDUCATION/TRAINING PROGRAM
Payer: COMMERCIAL

## 2023-11-22 DIAGNOSIS — H81.10 BENIGN PAROXYSMAL POSITIONAL VERTIGO, UNSPECIFIED LATERALITY: ICD-10-CM

## 2023-11-22 PROCEDURE — 95992 CANALITH REPOSITIONING PROC: CPT | Mod: GP

## 2023-11-22 PROCEDURE — 97162 PT EVAL MOD COMPLEX 30 MIN: CPT | Mod: GP

## 2023-11-22 NOTE — PROGRESS NOTES
PHYSICAL THERAPY EVALUATION  Type of Visit: Evaluation    See electronic medical record for Abuse and Falls Screening details.    Subjective       Presenting condition or subjective complaint: Pt is a 63 year old female referred to skilled PT services following an increase in dizziness. Pt reports she tipped on a ladder and the ladder fell on her and did not hit her head or have dizziness at that time. pt reports she was not dizzy until the next day and was worried about having brain issues and went to the MD and was diagnosed with BPPV and was given zofran and meclazine and given epley. pt reports she is woken up at night with the room spinning. Pt reports dizziness with R rolling and getting in/out of bed. 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  Date of onset: 11/16/23    Relevant medical history: Dizziness; Migraines or headaches   Dates & types of surgery: hearing aids and neck pain    Prior therapy history for the same diagnosis, illness or injury: No      Prior Level of Function  Transfers: Independent  Ambulation: Independent  ADL: Independent  IADL: Driving, Finances, Housekeeping, Laundry, Meal preparation, Yard work    Living Environment  Social support: With a significant other or spouse   Type of home: House   Stairs to enter the home: Yes 3 Is there a railing: Yes   Ramp: No   Stairs inside the home: Yes 10 Is there a railing: Yes   Help at home: None  Equipment owned:       Employment: No retired  Hobbies/Interests:      Patient goals for therapy: move around without dizziness and roll in bed without dizziness.    Pain assessment: Pain denied     Objective      Cognitive Status Examination  Orientation: Oriented to person, place and time   Level of Consciousness: Alert  Follows Commands and Answers Questions: 100% of the  time  Personal Safety and Judgement: Intact  Memory: Intact    OBSERVATION: pt very hesitant about moving and holding onto furniture and walls to walk    BED MOBILITY: Independent    TRANSFERS: Independent    GAIT:   Level of [place]: SBA  Assistive Device(s): None  Gait Deviations: Base of support increased    Gait Distance: 50 feet       VESTIBULAR EVALUATION  ADDITIONAL HISTORY:  Description of symptoms: Off balance; I feel like I am spinning; Attacks of dizziness; Likely to fall; I feel like the room is spinning  Dizzy attacks:   Start: last week after falling off the ladder but did not hit head   Last attack: 9 years ago   Frequency of occurrences: falling down with ladder while putting on artie lights   Length of attack: minutes  Difficulty hearing: Left ear  Noise in ears? Yes    Alleviates symptoms: stopping moving  Worsens symptoms: rolling in bed  Activities that bring on symptoms: Bending over  rolling in bed    Pertinent history of current vestibular problem: Hearing loss,      Cervicogenic Screen    Neck ROM Normal     Oculomotor Screen    Ocular ROM Normal   Smooth Pursuit Normal   Saccades Normal   VOR Normal   VOR Cancellation Normal   Head Impulse Test Normal   Convergence Testing Normal        Infrared Goggle Exam Vestibular Suppressant in Last 24 Hours? Yes  Exam Completed With:  frenzel lenses   Spontaneous Nystagmus Negative   Gaze Evoked Nystagmus Negative    Left Right   [place]-Hallpike Negative Upbeating R torsional   Sidelying Test Negative Negative   BPPV Canal(s): R Posterior  BPPV Type: Canalithasis    Dynamic Visual Acuity (DVA)    Static Acuity (LogMar)    Horizontal Head Movement at 1 Hz (LogMar)    Horizontal Head Movement at 2 Hz (LogMar)           Assessment & Plan   CLINICAL IMPRESSIONS  Medical Diagnosis: BPPV    Treatment Diagnosis: R posterior canal BPPV   Impression/Assessment: Patient is a 63 year old female with dizziness with position changes complaints.  The following  significant findings have been identified: Impaired gait, Instability, Dizziness, Disequilibrium , and Impaired vision. These impairments interfere with their ability to perform self care tasks, recreational activities, household chores, household mobility, and community mobility as compared to previous level of function.     Clinical Decision Making (Complexity):  Clinical Presentation: Evolving/Changing  Clinical Presentation Rationale: based on medical and personal factors listed in PT evaluation  Clinical Decision Making (Complexity): Moderate complexity    PLAN OF CARE  Treatment Interventions:  Interventions: Gait Training, Manual Therapy, Neuromuscular Re-education, Therapeutic Activity, Therapeutic Exercise, Canalith Repositioning    Long Term Goals     PT Goal 1  Goal Identifier: Dizziness  Goal Description: Pt will report ability to roll R and L on mat table or bed at home without an increase in dizziness.  Target Date: 12/06/23      Frequency of Treatment: 6 times  Duration of Treatment: 8 weeks    Education Assessment:   Learner/Method: Patient    Risks and benefits of evaluation/treatment have been explained.   Patient/Family/caregiver agrees with Plan of Care.     Evaluation Time:     PT Eval, Moderate Complexity Minutes (22057): 30     Signing Clinician: Deedee Benitez DPT

## 2023-11-29 ENCOUNTER — THERAPY VISIT (OUTPATIENT)
Dept: PHYSICAL THERAPY | Facility: OTHER | Age: 63
End: 2023-11-29
Attending: STUDENT IN AN ORGANIZED HEALTH CARE EDUCATION/TRAINING PROGRAM
Payer: COMMERCIAL

## 2023-11-29 DIAGNOSIS — H81.11 BENIGN PAROXYSMAL POSITIONAL VERTIGO OF RIGHT EAR: Primary | ICD-10-CM

## 2023-11-29 PROCEDURE — 95992 CANALITH REPOSITIONING PROC: CPT | Mod: GP

## 2024-02-21 ENCOUNTER — OFFICE VISIT (OUTPATIENT)
Dept: FAMILY MEDICINE | Facility: OTHER | Age: 64
End: 2024-02-21
Payer: COMMERCIAL

## 2024-02-21 VITALS
RESPIRATION RATE: 16 BRPM | HEIGHT: 67 IN | TEMPERATURE: 97.6 F | HEART RATE: 80 BPM | DIASTOLIC BLOOD PRESSURE: 78 MMHG | BODY MASS INDEX: 23.7 KG/M2 | SYSTOLIC BLOOD PRESSURE: 128 MMHG | WEIGHT: 151 LBS

## 2024-02-21 DIAGNOSIS — M25.521 PAIN AND SWELLING OF RIGHT ELBOW: ICD-10-CM

## 2024-02-21 DIAGNOSIS — L03.113 CELLULITIS OF RIGHT ELBOW: ICD-10-CM

## 2024-02-21 DIAGNOSIS — M70.31 BURSITIS OF RIGHT ELBOW, UNSPECIFIED BURSA: Primary | ICD-10-CM

## 2024-02-21 DIAGNOSIS — M25.421 PAIN AND SWELLING OF RIGHT ELBOW: ICD-10-CM

## 2024-02-21 LAB
ANION GAP SERPL CALCULATED.3IONS-SCNC: 13 MMOL/L (ref 7–15)
BASOPHILS # BLD AUTO: 0.1 10E3/UL (ref 0–0.2)
BASOPHILS NFR BLD AUTO: 1 %
BUN SERPL-MCNC: 13.5 MG/DL (ref 8–23)
CALCIUM SERPL-MCNC: 10 MG/DL (ref 8.8–10.2)
CHLORIDE SERPL-SCNC: 103 MMOL/L (ref 98–107)
CREAT SERPL-MCNC: 0.54 MG/DL (ref 0.51–0.95)
CRP SERPL-MCNC: 3.13 MG/L
DEPRECATED HCO3 PLAS-SCNC: 24 MMOL/L (ref 22–29)
EGFRCR SERPLBLD CKD-EPI 2021: >90 ML/MIN/1.73M2
EOSINOPHIL # BLD AUTO: 0.1 10E3/UL (ref 0–0.7)
EOSINOPHIL NFR BLD AUTO: 1 %
ERYTHROCYTE [DISTWIDTH] IN BLOOD BY AUTOMATED COUNT: 12.1 % (ref 10–15)
GLUCOSE SERPL-MCNC: 80 MG/DL (ref 70–99)
HCT VFR BLD AUTO: 38.3 % (ref 35–47)
HGB BLD-MCNC: 13.4 G/DL (ref 11.7–15.7)
IMM GRANULOCYTES # BLD: 0.1 10E3/UL
IMM GRANULOCYTES NFR BLD: 1 %
LYMPHOCYTES # BLD AUTO: 1.3 10E3/UL (ref 0.8–5.3)
LYMPHOCYTES NFR BLD AUTO: 12 %
MCH RBC QN AUTO: 32.6 PG (ref 26.5–33)
MCHC RBC AUTO-ENTMCNC: 35 G/DL (ref 31.5–36.5)
MCV RBC AUTO: 93 FL (ref 78–100)
MONOCYTES # BLD AUTO: 0.8 10E3/UL (ref 0–1.3)
MONOCYTES NFR BLD AUTO: 8 %
NEUTROPHILS # BLD AUTO: 8.1 10E3/UL (ref 1.6–8.3)
NEUTROPHILS NFR BLD AUTO: 77 %
NRBC # BLD AUTO: 0 10E3/UL
NRBC BLD AUTO-RTO: 0 /100
PLATELET # BLD AUTO: 291 10E3/UL (ref 150–450)
POTASSIUM SERPL-SCNC: 3.6 MMOL/L (ref 3.4–5.3)
RBC # BLD AUTO: 4.11 10E6/UL (ref 3.8–5.2)
SODIUM SERPL-SCNC: 140 MMOL/L (ref 135–145)
WBC # BLD AUTO: 10.5 10E3/UL (ref 4–11)

## 2024-02-21 PROCEDURE — 36415 COLL VENOUS BLD VENIPUNCTURE: CPT | Mod: ZL | Performed by: NURSE PRACTITIONER

## 2024-02-21 PROCEDURE — 80048 BASIC METABOLIC PNL TOTAL CA: CPT | Mod: ZL | Performed by: NURSE PRACTITIONER

## 2024-02-21 PROCEDURE — 250N000011 HC RX IP 250 OP 636: Performed by: NURSE PRACTITIONER

## 2024-02-21 PROCEDURE — 85004 AUTOMATED DIFF WBC COUNT: CPT | Mod: ZL | Performed by: NURSE PRACTITIONER

## 2024-02-21 PROCEDURE — 96372 THER/PROPH/DIAG INJ SC/IM: CPT | Performed by: NURSE PRACTITIONER

## 2024-02-21 PROCEDURE — 99214 OFFICE O/P EST MOD 30 MIN: CPT | Performed by: NURSE PRACTITIONER

## 2024-02-21 PROCEDURE — 86140 C-REACTIVE PROTEIN: CPT | Mod: ZL | Performed by: NURSE PRACTITIONER

## 2024-02-21 RX ORDER — CEPHALEXIN 500 MG/1
500 CAPSULE ORAL 4 TIMES DAILY
Qty: 40 CAPSULE | Refills: 0 | Status: SHIPPED | OUTPATIENT
Start: 2024-02-21 | End: 2024-03-02

## 2024-02-21 RX ORDER — PREDNISONE 20 MG/1
20 TABLET ORAL 2 TIMES DAILY
Qty: 10 TABLET | Refills: 0 | Status: SHIPPED | OUTPATIENT
Start: 2024-02-21 | End: 2024-02-26

## 2024-02-21 RX ORDER — CEFTRIAXONE SODIUM 1 G
1 VIAL (EA) INJECTION ONCE
Status: COMPLETED | OUTPATIENT
Start: 2024-02-21 | End: 2024-02-21

## 2024-02-21 RX ADMIN — CEFTRIAXONE SODIUM 1 G: 1 INJECTION, POWDER, FOR SOLUTION INTRAMUSCULAR; INTRAVENOUS at 12:55

## 2024-02-21 ASSESSMENT — PAIN SCALES - GENERAL: PAINLEVEL: EXTREME PAIN (8)

## 2024-02-21 NOTE — NURSING NOTE
"Chief Complaint   Patient presents with    Elbow Pain     Today - R elbow- hot and painful     Patient tx with ice and ibuprofen.  Unsure of how it became swollen.  Patient is leaving for [place] in the AM.    Initial /78 (BP Location: Left arm, Patient Position: Sitting, Cuff Size: Adult Regular)   Pulse 80   Temp 97.6  F (36.4  C) (Tympanic)   Resp 16   Ht 1.702 m (5' 7\")   Wt 68.5 kg (151 lb)   BMI 23.65 kg/m   Estimated body mass index is 23.65 kg/m  as calculated from the following:    Height as of this encounter: 1.702 m (5' 7\").    Weight as of this encounter: 68.5 kg (151 lb).     Advance Care Directive on file? no    FOOD SECURITY SCREENING QUESTIONS:    The next two questions are to help us understand your food security.  If you are feeling you need any assistance in this area, we have resources available to support you today.    Hunger Vital Signs:  Within the past 12 months we worried whether our food would run out before we got money to buy more. Never  Within the past 12 months the food we bought just didn't last and we didn't have money to get more. Never  Montserrat Pelayo LPN,SANJANA on 2/21/2024 at 12:04 PM      Montserrat Pelayo LPN     "

## 2024-02-21 NOTE — PROGRESS NOTES
ASSESSMENT/PLAN:     I have reviewed the nursing notes.  I have reviewed the findings, diagnosis, plan and need for follow up with the patient.      1. Pain and swelling of right elbow    - CBC and Differential  - CRP inflammation  - Basic Metabolic Panel  - predniSONE (DELTASONE) 20 MG tablet; Take 1 tablet (20 mg) by mouth 2 times daily for 5 days  Dispense: 10 tablet; Refill: 0    May use over-the-counter Tylenol or ibuprofen  PRN  Avoid use of NSAIDS while on oral steroids    2. Bursitis of right elbow, unspecified bursa    - cefTRIAXone (ROCEPHIN) in lidocaine 1% (PF) for IM administration 1 gm IM injection administered in clinic  - cephALEXin (KEFLEX) 500 MG capsule; Take 1 capsule (500 mg) by mouth 4 times daily for 10 days  Dispense: 40 capsule; Refill: 0  - predniSONE (DELTASONE) 20 MG tablet; Take 1 tablet (20 mg) by mouth 2 times daily for 5 days  Dispense: 10 tablet; Refill: 0    CBC with normal WBC and differential  CRP - non-elevated  BMP normal    Patient appropriate for outpatient treatment as she is afebrile, vital signs stable and labs are normal.    Wear splint during day and night to reduce risk of bumping and over use  May remove for showering  Continue to ice and elevate    Return immediately if worsening swelling/pain or development of fevers    3. Cellulitis of right elbow    - cefTRIAXone (ROCEPHIN) in lidocaine 1% (PF) for IM administration 1 gm IM injection administered in clinic  - cephALEXin (KEFLEX) 500 MG capsule; Take 1 capsule (500 mg) by mouth 4 times daily for 10 days  Dispense: 40 capsule; Refill: 0    Return immediately if worsening swelling/pain or development of fevers            I explained my diagnostic considerations and recommendations to the patient, who voiced understanding and agreement with the treatment plan. All questions were answered. We discussed potential side effects of any prescribed or recommended therapies, as well as expectations for response to  treatments.    Natalie Mccray NP  [facility]      SUBJECTIVE:   Анна Styles is a 64 year old female who presents to clinic today for the following health issues:  Elbow pain and swelling      HPI  Started this morning with pain and swelling of her right elbow with worsening with warmth and redness.  No known injury, trauma, or skin insult.  No fevers.  Mild chills today.    Decreased movement of elbow due to pain.  Right hand dominant.    Hx of easy bruising.  No anticoagulation.    Ibuprofen 400 mg about an hour ago.  Frequent icing today.          Past Medical History:   Diagnosis Date    Acute appendicitis with localized peritonitis 3/4/2020     Past Surgical History:   Procedure Laterality Date    CATARACT EXTRACTION Bilateral     GALLBLADDER SURGERY      HYSTERECTOMY      noncancerous, had bad periods    HYSTERECTOMY, PAP NO LONGER INDICATED       Social History     Tobacco Use    Smoking status: Never    Smokeless tobacco: Never   Substance Use Topics    Alcohol use: Not Currently     Current Outpatient Medications   Medication Sig Dispense Refill    vitamin D3 (CHOLECALCIFEROL) 10 MCG (400 UNIT) capsule Take 1 capsule (400 Units) by mouth daily as needed      meclizine (ANTIVERT) 25 MG tablet Take 1 tablet (25 mg) by mouth 3 times daily as needed for dizziness (Patient not taking: Reported on 2/21/2024) 30 tablet 3    ondansetron (ZOFRAN ODT) 4 MG ODT tab Take 1 tablet (4 mg) by mouth every 8 hours as needed for nausea (Patient not taking: Reported on 2/21/2024) 30 tablet 0     Allergies   Allergen Reactions    Eucalyptus Oil      Other reaction(s): Throat Swelling/Closing    Morphine Rash    Codeine Nausea and Vomiting         Past medical history, past surgical history, current medications and allergies reviewed and accurate to the best of my knowledge.        OBJECTIVE:     /78 (BP Location: Left arm, Patient Position: Sitting, Cuff Size: Adult Regular)   Pulse 80   Temp  "97.6  F (36.4  C) (Tympanic)   Resp 16   Ht 1.702 m (5' 7\")   Wt 68.5 kg (151 lb)   BMI 23.65 kg/m    Body mass index is 23.65 kg/m .        Physical Exam  General Appearance: Well appearing adult female, appropriate appearance for age. No acute distress  Cardiac:  CMS intact to right upper extremity with brisk capillary refill and strong radial pulse   Musculoskeletal:  Equal movement of bilateral upper extremities with exception of right elbow.  Right posterior elbow with swelling and significant tenderness.  Decreased flexion of right elbow due to pain and guarding.  Near normal flexion of right elbow.  Equal movement of bilateral lower extremities.  Normal gait.    Dermatological:  Right posterior elbow with large circular area of bright erythema with warmth, swelling present with fluctuance, skin intact.  See attached photo.  Psychological: normal affect, alert, oriented, and pleasant.       Labs:  Results for orders placed or performed in visit on 02/21/24   CRP inflammation     Status: Normal   Result Value Ref Range    CRP Inflammation 3.13 <5.00 mg/L   Basic Metabolic Panel     Status: Normal   Result Value Ref Range    Sodium 140 135 - 145 mmol/L    Potassium 3.6 3.4 - 5.3 mmol/L    Chloride 103 98 - 107 mmol/L    Carbon Dioxide (CO2) 24 22 - 29 mmol/L    Anion Gap 13 7 - 15 mmol/L    Urea Nitrogen 13.5 8.0 - 23.0 mg/dL    Creatinine 0.54 0.51 - 0.95 mg/dL    GFR Estimate >90 >60 mL/min/1.73m2    Calcium 10.0 8.8 - 10.2 mg/dL    Glucose 80 70 - 99 mg/dL   CBC with platelets and differential     Status: None   Result Value Ref Range    WBC Count 10.5 4.0 - 11.0 10e3/uL    RBC Count 4.11 3.80 - 5.20 10e6/uL    Hemoglobin 13.4 11.7 - 15.7 g/dL    Hematocrit 38.3 35.0 - 47.0 %    MCV 93 78 - 100 fL    MCH 32.6 26.5 - 33.0 pg    MCHC 35.0 31.5 - 36.5 g/dL    RDW 12.1 10.0 - 15.0 %    Platelet Count 291 150 - 450 10e3/uL    % Neutrophils 77 %    % Lymphocytes 12 %    % Monocytes 8 %    % Eosinophils 1 %    % " Basophils 1 %    % Immature Granulocytes 1 %    NRBCs per 100 WBC 0 <1 /100    Absolute Neutrophils 8.1 1.6 - 8.3 10e3/uL    Absolute Lymphocytes 1.3 0.8 - 5.3 10e3/uL    Absolute Monocytes 0.8 0.0 - 1.3 10e3/uL    Absolute Eosinophils 0.1 0.0 - 0.7 10e3/uL    Absolute Basophils 0.1 0.0 - 0.2 10e3/uL    Absolute Immature Granulocytes 0.1 <=0.4 10e3/uL    Absolute NRBCs 0.0 10e3/uL   CBC and Differential     Status: None    Narrative    The following orders were created for panel order CBC and Differential.  Procedure                               Abnormality         Status                     ---------                               -----------         ------                     CBC with platelets and d...[[id number]]                      Final result                 Please view results for these tests on the individual orders.

## 2024-02-21 NOTE — PATIENT INSTRUCTIONS
Wear splint during day and night to reduce risk of bumping and over use  May remove for showering  Continue to ice and elevate    Return immediately if worsening swelling/pain or development of fevers

## 2024-02-28 ENCOUNTER — OFFICE VISIT (OUTPATIENT)
Dept: FAMILY MEDICINE | Facility: OTHER | Age: 64
End: 2024-02-28
Attending: PHYSICIAN ASSISTANT
Payer: COMMERCIAL

## 2024-02-28 VITALS
HEART RATE: 86 BPM | BODY MASS INDEX: 23.65 KG/M2 | WEIGHT: 151 LBS | TEMPERATURE: 97.1 F | DIASTOLIC BLOOD PRESSURE: 78 MMHG | RESPIRATION RATE: 20 BRPM | SYSTOLIC BLOOD PRESSURE: 142 MMHG | OXYGEN SATURATION: 96 %

## 2024-02-28 DIAGNOSIS — M70.31 BURSITIS OF RIGHT ELBOW, UNSPECIFIED BURSA: ICD-10-CM

## 2024-02-28 DIAGNOSIS — M25.521 PAIN AND SWELLING OF RIGHT ELBOW: Primary | ICD-10-CM

## 2024-02-28 DIAGNOSIS — L03.113 CELLULITIS OF RIGHT ELBOW: ICD-10-CM

## 2024-02-28 DIAGNOSIS — M25.421 PAIN AND SWELLING OF RIGHT ELBOW: Primary | ICD-10-CM

## 2024-02-28 PROCEDURE — 99214 OFFICE O/P EST MOD 30 MIN: CPT | Performed by: PHYSICIAN ASSISTANT

## 2024-02-28 PROCEDURE — 250N000011 HC RX IP 250 OP 636: Performed by: PHYSICIAN ASSISTANT

## 2024-02-28 PROCEDURE — 96372 THER/PROPH/DIAG INJ SC/IM: CPT | Performed by: PHYSICIAN ASSISTANT

## 2024-02-28 RX ORDER — CEFTRIAXONE SODIUM 1 G
1 VIAL (EA) INJECTION ONCE
Status: COMPLETED | OUTPATIENT
Start: 2024-02-28 | End: 2024-02-28

## 2024-02-28 RX ORDER — SULFAMETHOXAZOLE/TRIMETHOPRIM 800-160 MG
1 TABLET ORAL 2 TIMES DAILY
Qty: 20 TABLET | Refills: 0 | Status: SHIPPED | OUTPATIENT
Start: 2024-02-28 | End: 2024-03-06

## 2024-02-28 RX ADMIN — CEFTRIAXONE SODIUM 1 G: 1 INJECTION, POWDER, FOR SOLUTION INTRAMUSCULAR; INTRAVENOUS at 14:36

## 2024-02-28 ASSESSMENT — PAIN SCALES - GENERAL: PAINLEVEL: MODERATE PAIN (5)

## 2024-02-28 NOTE — NURSING NOTE
"Patient presents to the clinic for [facility] follow up.    FOOD SECURITY SCREENING QUESTIONS:    The next two questions are to help us understand your food security.  If you are feeling you need any assistance in this area, we have resources available to support you today.    Hunger Vital Signs:  Within the past 12 months we worried whether our food would run out before we got money to buy more. Never  Within the past 12 months the food we bought just didn't last and we didn't have money to get more. Never    Advance Care Directive on file? no  Advance Care Directive provided to patient? Declined.      Chief Complaint   Patient presents with    Infection       Initial BP (!) 142/78 (BP Location: Left arm, Patient Position: Sitting, Cuff Size: Adult Regular)   Pulse 86   Temp 97.1  F (36.2  C) (Temporal)   Resp 20   Wt 68.5 kg (151 lb)   SpO2 96%   BMI 23.65 kg/m   Estimated body mass index is 23.65 kg/m  as calculated from the following:    Height as of 2/21/24: 1.702 m (5' 7\").    Weight as of this encounter: 68.5 kg (151 lb).  Medication Reconciliation: complete        Ursula Tolbert LPN     "

## 2024-02-28 NOTE — PATIENT INSTRUCTIONS
You were prescribed an antibiotic, please take into consideration the following information:  - Take entire course of antibiotic even if you start to feel better.  - Antibiotics can cause stomach upset including nausea and diarrhea. Read your bottle or ask the pharmacist if antibiotic can be taken with food to help prevent nausea. If you have symptoms of diarrhea you can take an over-the-counter probiotic and/or increase foods with probiotics such as yogurt, Mando, sauerkraut.  -Use caution in sunlight as can lead to increased risk of sunburn while on ABX (antibiotics).     Please refer to your AVS for follow up and pain/symptoms management recommendations (I.e.: medications, helpful conservative treatment modalities, appropriate follow up if need to a specialist or family practice, etc.). Please return to urgent care if your symptoms change or worsen.     Discharge instructions:  -If you were prescribed a medication(s), please take this as prescribed/directed  -Monitor your symptoms, if changing/worsening, return to UC/ER or PCP for follow up    Cellulitis/Skin Infection   -If you received a prescription for a topical or oral antibiotic, please take/use as directed.   -Keep the area clean and dry.   -If needed - for pain control - we recommend alternating Tylenol and Ibuprofen if you are able to take these medications. Alternate every 4 hours as needed. I.e.: Ibuprofen at 8am, Tylenol 12pm, Ibuprofen 4pm    -Daily maximum of Tylenol is 4000mg (recommend staying under 3000mg)   -Daily maximum of Ibuprofen is 3200 mg    Watch for worsening symptoms such as fevers, chills, worsening redness, abnormal drainage from site of cellulitis, etc.

## 2024-02-28 NOTE — PROGRESS NOTES
Assessment & Plan       ICD-10-CM    1. Pain and swelling of right elbow  M25.521 cefTRIAXone (ROCEPHIN) in lidocaine 1% (PF) for IM administration 1 g    M25.421 sulfamethoxazole-trimethoprim (BACTRIM DS) 800-160 MG tablet     Orthopedic  Referral      2. Bursitis of right elbow, unspecified bursa  M70.31 cefTRIAXone (ROCEPHIN) in lidocaine 1% (PF) for IM administration 1 g     sulfamethoxazole-trimethoprim (BACTRIM DS) 800-160 MG tablet     Orthopedic  Referral      3. Cellulitis of right elbow  L03.113 cefTRIAXone (ROCEPHIN) in lidocaine 1% (PF) for IM administration 1 g     sulfamethoxazole-trimethoprim (BACTRIM DS) 800-160 MG tablet     Orthopedic  Referral        Ongoing cellulitis and olecranon bursitis of the right elbow.  No falls, injuries and/or trauma.  No other precipitating factors.  Patient is afebrile, this is reassuring.  Reviewed lab work from 2/21/2024, this is normal.  Will repeat Rocephin injection, 1 g given by nursing team today.  Will switch to Bactrim for additional cross coverage profile.  Reviewed risk, benefits potential side effects of medication.  If fevers, chills, worsening redness or swelling or other symptomatology occurs recommend prompt reevaluation in the emergency room.  I also placed a referral to orthopedics, I would like patient to follow-up within the next week.  She states that Thursdays do not work for her, otherwise she is free.  I attempted to reach out to orthopedic surgery nurse today, however, they are out of the office.  Will attempt tomorrow. Patient is in agreement and understanding of the above treatment plan. All questions and concerns were addressed and answered to patient's satisfaction. AVS reviewed with patient.     See Patient Instructions    Return if symptoms worsen or fail to improve.    Edwige Jj is a 64 year old, presenting for the following health issues:  Infection      2/28/2024     1:48 PM   Additional  Questions   Roomed by lorenzo rahman lpn     History of Present Illness       Reason for visit:  Cellulits and bursiris in my right elbow that is not gerring better    She eats 4 or more servings of fruits and vegetables daily.She consumes 0 sweetened beverage(s) daily.She exercises with enough effort to increase her heart rate 30 to 60 minutes per day.  She exercises with enough effort to increase her heart rate 6 days per week.   She is taking medications regularly.     Анна presents to clinic today in follow-up from her clinic visit on 2/21/2024.  She reports a 1 week history of swelling and discomfort to the posterior right elbow.  While seen in [facility] clinic, she was given a 1 g injection of Rocephin and started on Keflex 4 times daily for 10 days.  She was also given a prescription for prednisone, she finished her prednisone on Monday, 2/26/2024.  She states the elbow was light pink on Monday, however, today the elbow has become red and hot.  She does report increasing pain and swelling.  No fevers or chills.  No change to range of motion.  No falls, injuries and/or trauma.  No insect bites.  No history of prolonged pressure/flexion of the elbow.  She has trialed ice in addition to oral NSAIDs for pharmacotherapy.    ED/UC Followup:    Facility: [facility]  Date of visit: 2-21-24  Reason for visit: Bursitis of right elbow  Current Status: flared up again    Review of Systems  Constitutional, HEENT, cardiovascular, pulmonary, GI, , musculoskeletal, neuro, skin, endocrine and psych systems are negative, except as otherwise noted.        Objective    BP (!) 142/78 (BP Location: Left arm, Patient Position: Sitting, Cuff Size: Adult Regular)   Pulse 86   Temp 97.1  F (36.2  C) (Temporal)   Resp 20   Wt 68.5 kg (151 lb)   SpO2 96%   BMI 23.65 kg/m    Body mass index is 23.65 kg/m .  Physical Exam   GENERAL: alert and no distress  EYES: Eyes grossly normal to inspection, PERRL and conjunctivae and sclerae  normal  RESP: lungs clear to auscultation - no rales, rhonchi or wheezes  CV: regular rate and rhythm, normal S1 S2, no S3 or S4, no murmur, click or rub, no peripheral edema  MS: Minimal decreased flexion of the right elbow.  Extension, pronation and supination of the right elbow.  Olecranon bursa is moderately edematous, minimal calor.  No drainage or signs of trauma.  SKIN: see above  PSYCH: mentation appears normal, affect normal/bright    Signed Electronically by: Sophie Manzanares PA-C

## 2024-03-01 ENCOUNTER — OFFICE VISIT (OUTPATIENT)
Dept: ORTHOPEDICS | Facility: OTHER | Age: 64
End: 2024-03-01
Attending: PHYSICIAN ASSISTANT
Payer: COMMERCIAL

## 2024-03-01 VITALS — OXYGEN SATURATION: 97 % | HEART RATE: 96 BPM

## 2024-03-01 DIAGNOSIS — M70.31 BURSITIS OF RIGHT ELBOW, UNSPECIFIED BURSA: ICD-10-CM

## 2024-03-01 DIAGNOSIS — L03.113 CELLULITIS OF RIGHT ELBOW: ICD-10-CM

## 2024-03-01 DIAGNOSIS — M25.421 PAIN AND SWELLING OF RIGHT ELBOW: ICD-10-CM

## 2024-03-01 DIAGNOSIS — M25.521 PAIN AND SWELLING OF RIGHT ELBOW: ICD-10-CM

## 2024-03-01 PROCEDURE — 99203 OFFICE O/P NEW LOW 30 MIN: CPT | Performed by: SPECIALIST

## 2024-03-01 PROCEDURE — 96372 THER/PROPH/DIAG INJ SC/IM: CPT | Performed by: SPECIALIST

## 2024-03-01 PROCEDURE — 250N000011 HC RX IP 250 OP 636: Performed by: SPECIALIST

## 2024-03-01 RX ORDER — CEFTRIAXONE SODIUM 1 G
1 VIAL (EA) INJECTION ONCE
Status: COMPLETED | OUTPATIENT
Start: 2024-03-01 | End: 2024-03-01

## 2024-03-01 RX ADMIN — CEFTRIAXONE SODIUM 1 G: 1 INJECTION, POWDER, FOR SOLUTION INTRAMUSCULAR; INTRAVENOUS at 15:50

## 2024-03-01 ASSESSMENT — PAIN SCALES - GENERAL: PAINLEVEL: SEVERE PAIN (6)

## 2024-03-01 NOTE — PROGRESS NOTES
Surgical Clinic Consult  Primary physician:     Alis Taylor      History of present illness:  This is a 64 year old right hand dominant seen for right elbow swelling.  The patient is a retired .  On approximately 2/21/2024 she woke up noting swelling about the right elbow.  She was seen in the emergency room that day given her Rocephin shot but also placed on prednisone.  She was also placed on Keflex.  On 9/2/2020 8-24 she was placed on 9 Bactrim.  She has had no fevers or chills no penetrating trauma.  Past medical history:   Past Medical History:   Diagnosis Date    Acute appendicitis with localized peritonitis 3/4/2020       Pastsurgical history:  Past Surgical History:   Procedure Laterality Date    CATARACT EXTRACTION Bilateral     GALLBLADDER SURGERY      HYSTERECTOMY      noncancerous, had bad periods    HYSTERECTOMY, PAP NO LONGER INDICATED         Current medications:  Current Outpatient Medications   Medication Sig Dispense Refill    cephALEXin (KEFLEX) 500 MG capsule Take 1 capsule (500 mg) by mouth 4 times daily for 10 days 40 capsule 0    meclizine (ANTIVERT) 25 MG tablet Take 1 tablet (25 mg) by mouth 3 times daily as needed for dizziness (Patient not taking: Reported on 2/21/2024) 30 tablet 3    ondansetron (ZOFRAN ODT) 4 MG ODT tab Take 1 tablet (4 mg) by mouth every 8 hours as needed for nausea (Patient not taking: Reported on 2/21/2024) 30 tablet 0    sulfamethoxazole-trimethoprim (BACTRIM DS) 800-160 MG tablet Take 1 tablet by mouth 2 times daily for 10 days 20 tablet 0    vitamin D3 (CHOLECALCIFEROL) 10 MCG (400 UNIT) capsule Take 1 capsule (400 Units) by mouth daily as needed         Allergies:  Allergies   Allergen Reactions    Eucalyptus Oil      Other reaction(s): Throat Swelling/Closing    Morphine Rash    Codeine Nausea and Vomiting       Family history:  Family History   Problem Relation Age of Onset    Heart Disease Father        Social history:  Social History      Socioeconomic History    Marital status:      Spouse name: Not on file    Number of children: Not on file    Years of education: Not on file    Highest education level: Not on file   Occupational History    Not on file   Tobacco Use    Smoking status: Never    Smokeless tobacco: Never   Vaping Use    Vaping Use: Never used   Substance and Sexual Activity    Alcohol use: Not Currently    Drug use: Never    Sexual activity: Not Currently   Other Topics Concern    Not on file   Social History Narrative    Not on file     Social Determinants of Health     Financial Resource Strain: Low Risk  (2/28/2024)    Financial Resource Strain     Within the past 12 months, have you or your family members you live with been unable to get utilities (heat, electricity) when it was really needed?: No   Food Insecurity: Low Risk  (2/28/2024)    Food Insecurity     Within the past 12 months, did you worry that your food would run out before you got money to buy more?: No     Within the past 12 months, did the food you bought just not last and you didn t have money to get more?: No   Transportation Needs: Low Risk  (2/28/2024)    Transportation Needs     Within the past 12 months, has lack of transportation kept you from medical appointments, getting your medicines, non-medical meetings or appointments, work, or from getting things that you need?: No   Physical Activity: Not on file   Stress: Not on file   Social Connections: Not on file   Interpersonal Safety: Low Risk  (2/28/2024)    Interpersonal Safety     Do you feel physically and emotionally safe where you currently live?: Yes     Within the past 12 months, have you been hit, slapped, kicked or otherwise physically hurt by someone?: No     Within the past 12 months, have you been humiliated or emotionally abused in other ways by your partner or ex-partner?: No   Housing Stability: Low Risk  (2/28/2024)    Housing Stability     Do you have housing? : Yes     Are you  worried about losing your housing?: No       PROBLEM LIST:  Patient Active Problem List   Diagnosis    Achilles tendonitis    Baker's cyst of knee    BPV (benign positional vertigo)    Leukopenia    Syncope and collapse    Toenail fungus       Review of Systems:  COMPLETE 12 point REVIEW OF SYSTEMS is otherwise negative with the exception of which is stated above.    Physical exam: Pulse 96   SpO2 97%     General: this is a pleasant female patient in no acute distress.  Patient is awake alert and oriented x3 .  Well-groomed, well kempt.  EXAM:  Musculoskeletal: Examination of the right elbow reveals erythema over the olecranon bursa there is no evidence of fluctuance.  Elbow range of motion is well-preserved.    Imaging: No imaging obtained today    Assessment:   Right olecranon bursitis    Plan:    We discussed continuing with the Bactrim to see if this gives her significant relief.  If she has persistent problems a olecranon bursectomy could be performed but at this point I think this is more of a cellulitis.  I have given her my card and asked her to call the [place] office if her symptoms worsen.      Jose Pryor MD

## 2024-03-06 ENCOUNTER — MYC MEDICAL ADVICE (OUTPATIENT)
Dept: FAMILY MEDICINE | Facility: OTHER | Age: 64
End: 2024-03-06
Payer: COMMERCIAL

## 2024-03-06 DIAGNOSIS — L03.113 CELLULITIS OF RIGHT ELBOW: ICD-10-CM

## 2024-03-06 DIAGNOSIS — M25.421 PAIN AND SWELLING OF RIGHT ELBOW: ICD-10-CM

## 2024-03-06 DIAGNOSIS — M25.521 PAIN AND SWELLING OF RIGHT ELBOW: ICD-10-CM

## 2024-03-06 DIAGNOSIS — M70.31 BURSITIS OF RIGHT ELBOW, UNSPECIFIED BURSA: ICD-10-CM

## 2024-03-06 RX ORDER — SULFAMETHOXAZOLE/TRIMETHOPRIM 800-160 MG
1 TABLET ORAL 2 TIMES DAILY
Qty: 20 TABLET | Refills: 0 | Status: SHIPPED | OUTPATIENT
Start: 2024-03-06

## 2024-03-06 NOTE — TELEPHONE ENCOUNTER
[facility] sent Rx request for the following:      Requested Prescriptions   Pending Prescriptions Disp Refills    sulfamethoxazole-trimethoprim (BACTRIM DS) 800-160 MG tablet 20 tablet 0     Sig: Take 1 tablet by mouth 2 times daily       Oral Acne/Rosacea Medications Protocol Failed - 3/6/2024 11:43 AM        Failed - Confirmation of diagnosis is required     Please confirm diagnosis is acne or rosacea.     If NOT acne or rosacea; refer request to provider for further evaluation.    If diagnosis IS acne or rosacea, OK to refill BASED ON PREVIOUS REFILL CLINICAL NOTE RECOMMENDATION.         Last Prescription Date:   2/28/24  Last Fill Qty/Refills:         20, R-0    Last Office Visit:              2/28/24   Future Office visit:           none    Routing refill request to provider for review/approval because:  Drug not on the [facility] refill protocol     Cookie Pagan RN on 3/6/2024 at 11:46 AM            
I will fill additional 7 days. However, if persistent despite this, recommend evaluation as ongoing antibiotics can increase risk of C diff.     Sophie Manzanares PA-C  3/6/2024  2:45 PM    
Reason for call: Medication or medication refill    Name of medication requested: Bactrim    How many days of medication do you have left? 2 days    What pharmacy do you use? [facility]    Preferred method for responding to this message: Telephone Call    Phone number patient can be reached at: Cell number on file:    Telephone Information:   Mobile [phone]       If we cannot reach you directly, may we leave a detailed response at the number you provided? Yes     Celulitus and Bersitis are better but not gone- is leaving  out of town on Friday and would like a refill by tomorrow as will probably have to take few xtra days.    
Updated on "[employer]" message.     Lina Jolly RN on 3/6/2024 at 3:48 PM    
None

## 2024-03-06 NOTE — TELEPHONE ENCOUNTER
From Sophie Manzanares Routing Comment from today's refill encounter:       I will fill additional 7 days. However, if persistent despite this, recommend evaluation as ongoing antibiotics can increase risk of C diff.      Sophie Manzanares PA-C  3/6/2024  2:45 PM             Patient update on MyChart.    Lina Jolly RN on 3/6/2024 at 3:48 PM

## 2024-07-11 ENCOUNTER — PATIENT OUTREACH (OUTPATIENT)
Dept: FAMILY MEDICINE | Facility: OTHER | Age: 64
End: 2024-07-11
Payer: COMMERCIAL

## 2024-07-11 NOTE — LETTER
[facility]  [address]  [place] [id number] [phone]       July 11, 2024    Анна Styles  [address]  [place] [address]    Dear Анна,    We care about your health and have reviewed your health plan and are making recommendations based on this review, to optimize your health.    You are in particular need of attention regarding:  -Wellness (Physical) Visit     We are recommending that you:  -schedule a WELLNESS (Physical) APPOINTMENT with me.       In addition, here is a list of due or overdue Health Maintenance reminders.    Health Maintenance Due   Topic Date Due    Osteoporosis Screening  Never done    Colorectal Cancer Screening  Never done    RSV VACCINE (Pregnancy & 60+) (1 - 1-dose 60+ series) Never done    COVID-19 Vaccine (5 - 2023-24 season) 09/01/2023    Annual Wellness Visit  03/13/2024       To address the above recommendations, we encourage you to contact us at [phone].  They will assist you with finding the most convenient time and location.    Thank you for trusting [facility] and we appreciate the opportunity to serve you.  We look forward to supporting your healthcare needs in the future.    Healthy Regards,    Your [facility] Team

## 2024-07-11 NOTE — TELEPHONE ENCOUNTER
Patient Quality Outreach    Patient is due for the following:   Physical Annual Wellness Visit    Next Steps:   Schedule a Annual Wellness Visit    Type of outreach:    Sent letter.      Questions for provider review:    None           Emely Milton

## 2025-03-26 ENCOUNTER — PATIENT OUTREACH (OUTPATIENT)
Dept: CARE COORDINATION | Facility: CLINIC | Age: 65
End: 2025-03-26
Payer: COMMERCIAL

## 2025-04-10 ENCOUNTER — PATIENT OUTREACH (OUTPATIENT)
Dept: CARE COORDINATION | Facility: CLINIC | Age: 65
End: 2025-04-10
Payer: COMMERCIAL

## (undated) RX ORDER — CEFTRIAXONE SODIUM 1 G
VIAL (EA) INJECTION
Status: DISPENSED
Start: 2024-02-21

## (undated) RX ORDER — CEFTRIAXONE SODIUM 1 G
VIAL (EA) INJECTION
Status: DISPENSED
Start: 2024-02-28

## (undated) RX ORDER — LIDOCAINE HYDROCHLORIDE 10 MG/ML
INJECTION, SOLUTION INFILTRATION; PERINEURAL
Status: DISPENSED
Start: 2024-03-01

## (undated) RX ORDER — LIDOCAINE HYDROCHLORIDE 10 MG/ML
INJECTION, SOLUTION EPIDURAL; INFILTRATION; INTRACAUDAL; PERINEURAL
Status: DISPENSED
Start: 2024-02-28

## (undated) RX ORDER — CEFTRIAXONE SODIUM 1 G
VIAL (EA) INJECTION
Status: DISPENSED
Start: 2024-03-01

## (undated) RX ORDER — LIDOCAINE HYDROCHLORIDE 10 MG/ML
INJECTION, SOLUTION EPIDURAL; INFILTRATION; INTRACAUDAL; PERINEURAL
Status: DISPENSED
Start: 2024-02-21